# Patient Record
Sex: MALE | Race: ASIAN | NOT HISPANIC OR LATINO | Employment: FULL TIME | URBAN - METROPOLITAN AREA
[De-identification: names, ages, dates, MRNs, and addresses within clinical notes are randomized per-mention and may not be internally consistent; named-entity substitution may affect disease eponyms.]

---

## 2019-03-24 ENCOUNTER — HOSPITAL ENCOUNTER (EMERGENCY)
Facility: HOSPITAL | Age: 34
Discharge: HOME | End: 2019-03-24
Attending: EMERGENCY MEDICINE | Admitting: EMERGENCY MEDICINE
Payer: COMMERCIAL

## 2019-03-24 VITALS
RESPIRATION RATE: 18 BRPM | HEIGHT: 68 IN | DIASTOLIC BLOOD PRESSURE: 81 MMHG | BODY MASS INDEX: 29.5 KG/M2 | OXYGEN SATURATION: 98 % | WEIGHT: 194.67 LBS | TEMPERATURE: 98 F | SYSTOLIC BLOOD PRESSURE: 145 MMHG | HEART RATE: 84 BPM

## 2019-03-24 DIAGNOSIS — F32.A DEPRESSION: Primary | ICD-10-CM

## 2019-03-24 PROBLEM — F43.21 GRIEF REACTION: Status: ACTIVE | Noted: 2019-03-24

## 2019-03-24 PROBLEM — F10.10 ALCOHOL ABUSE, EPISODIC: Status: ACTIVE | Noted: 2019-03-24

## 2019-03-24 PROBLEM — F39 MOOD DISORDER (HCC): Status: ACTIVE | Noted: 2019-03-24

## 2019-03-24 LAB
AMPHETAMINES SERPL QL SCN: NEGATIVE
BARBITURATES UR QL: NEGATIVE
BENZODIAZ UR QL: NEGATIVE
COCAINE UR QL: NEGATIVE
ETHANOL EXG-MCNC: 0.11 MG/DL
METHADONE UR QL: NEGATIVE
OPIATES UR QL SCN: NEGATIVE
PCP UR QL: NEGATIVE
THC UR QL: NEGATIVE

## 2019-03-24 PROCEDURE — 99285 EMERGENCY DEPT VISIT HI MDM: CPT

## 2019-03-24 PROCEDURE — 82075 ASSAY OF BREATH ETHANOL: CPT | Performed by: EMERGENCY MEDICINE

## 2019-03-24 PROCEDURE — 99243 OFF/OP CNSLTJ NEW/EST LOW 30: CPT | Performed by: PHYSICIAN ASSISTANT

## 2019-03-24 PROCEDURE — 80307 DRUG TEST PRSMV CHEM ANLYZR: CPT | Performed by: EMERGENCY MEDICINE

## 2019-03-24 NOTE — ED NOTES
All belongings returned to pt upon discharge including those locked up with security  Paperwork signed by pt and pt left ED with all belongings       Umesh Chau RN  03/24/19 7343

## 2019-03-24 NOTE — ED NOTES
Met with patient, his wife and Dr Jagruti Garcia  Pt insisting that he is not suicidal, was drinking last night and that his wife was arguing with him when they were home  He is concerned about fincances  He is refusing inpatient admission but is agreeable to meeting with the psychiatrist to discuss treatment  Dr Jagruti Garcia put in a psychiatric evaluation   Consult
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
## 2019-03-24 NOTE — ED NOTES
Per EMS patient called 911 for suicidal ideations  Upon arrival patient was uncooperative stating "I don't know why I called; Next time Ill just do it so I don't waste tax payer dollars"  Patient immediately started asking when he could leave upon arrival to the ED

## 2019-03-24 NOTE — ED NOTES
Pt  Awake,cooperative - is aware that he is waiting for the psych doctor       Darrell Tinoco, KHALIF  03/24/19 1272

## 2019-03-24 NOTE — ED NOTES
Patient was evaluated by Nora Blas PA-C and he reviewed the situation with Dr Shanika Garcia   (see note)  Recommendation that patient can be discharged with outpt services  Patient given information on support groups for grief issues, and also [address] [place] [address] office in [place] for outpt counseling

## 2019-03-24 NOTE — ED PROVIDER NOTES
History  Chief Complaint   Patient presents with    Suicidal     Pt's father passed away 2 weeks ago and has a lot of issues going on  Pt states, "I should never have called  Next time I won't call " EMS reports that pt is suicidal without plan  Pt seems to have many concerns about the cost of the visit  70-year-old male past medical history of depression presents for evaluation of depression and suicidal ideation  Patient states that he lost his dad to use weeks ago and has been having problems with his wife pressure him to have a baby  He states that because of all the stress he drank this evening, felt hopeless and suicidal with no clear plan  He called EMS and made some vague statements about "next time I will not call" but never specifically made threats of suicide  He is obviously intoxicated with slurred speech  Currently denies any drug use  States that he drinks occasionally  No history of suicidal ideation but does have a history of depression for which he is not being medically treated  He states that he is "damaged "and needs somebody to talk to but is unable to get resources  No history of psychiatric admissions  Per EMS patient made multiple statements about feeling depressed and suicidal, he made multiple delusions to next I am not calling for evaluation and not coming to the hospital   Per EMS assessment patient seemed to be a danger to himself given his multiple vague statements  When I was talking him patient is very around about a with his answers and did not specifically talked about a plan however did state multiple times that he is very damaged and there is nothing anybody can do to help him  None       History reviewed  No pertinent past medical history  History reviewed  No pertinent surgical history  History reviewed  No pertinent family history  I have reviewed and agree with the history as documented      Social History     Tobacco Use    Smoking status: Current Every Day Smoker     Packs/day: 0 20    Smokeless tobacco: Never Used   Substance Use Topics    Alcohol use: Yes     Comment: socially    Drug use: No        Review of Systems   Constitutional: Negative for appetite change, chills and fever  HENT: Negative for rhinorrhea and sore throat  Eyes: Negative for photophobia and visual disturbance  Respiratory: Negative for cough and shortness of breath  Cardiovascular: Negative for chest pain and palpitations  Gastrointestinal: Negative for abdominal pain and diarrhea  Genitourinary: Negative for dysuria, frequency and urgency  Skin: Negative for rash  Neurological: Negative for dizziness and weakness  Psychiatric/Behavioral: Positive for dysphoric mood and suicidal ideas  Negative for self-injury  All other systems reviewed and are negative  Physical Exam  Physical Exam   Constitutional: He is oriented to person, place, and time  He appears well-developed and well-nourished  HENT:   Head: Normocephalic and atraumatic  Right Ear: External ear normal    Left Ear: External ear normal    Mouth/Throat: Oropharynx is clear and moist    Eyes: Pupils are equal, round, and reactive to light  Conjunctivae and EOM are normal    Neck: Normal range of motion  Neck supple  No JVD present  No tracheal deviation present  Cardiovascular: Normal rate, regular rhythm and normal heart sounds  Exam reveals no gallop and no friction rub  No murmur heard  Pulmonary/Chest: Effort normal  No stridor  No respiratory distress  He has no wheezes  He has no rales  Abdominal: Soft  He exhibits no distension and no mass  There is no tenderness  There is no rebound and no guarding  Musculoskeletal: Normal range of motion  He exhibits no edema  Neurological: He is alert and oriented to person, place, and time  No cranial nerve deficit  Slurred speech otherwise nonfocal neurologic exam   Skin: Skin is warm and dry  No rash noted  No erythema   No pallor  Psychiatric: His affect is labile  His speech is slurred  He exhibits a depressed mood  He expresses suicidal ideation  He expresses no suicidal plans  Patient with very labile affect in the room   Nursing note and vitals reviewed  Vital Signs  ED Triage Vitals [03/24/19 0422]   Temperature Pulse Respirations Blood Pressure SpO2   98 °F (36 7 °C) 84 18 145/81 98 %      Temp Source Heart Rate Source Patient Position - Orthostatic VS BP Location FiO2 (%)   Tympanic Monitor Lying Left arm --      Pain Score       --           Vitals:    03/24/19 0422   BP: 145/81   Pulse: 84   Patient Position - Orthostatic VS: Lying         Visual Acuity      ED Medications  Medications - No data to display    Diagnostic Studies  Results Reviewed     Procedure Component Value Units Date/Time    Rapid drug screen, urine [[id number]]  (Normal) Collected:  03/24/19 0445    Lab Status:  Final result Specimen:  Urine, Clean Catch Updated:  03/24/19 0519     Amph/Meth UR Negative     Barbiturate Ur Negative     Benzodiazepine Urine Negative     Cocaine Urine Negative     Methadone Urine Negative     Opiate Urine Negative     PCP Ur Negative     THC Urine Negative    Narrative:       FOR MEDICAL PURPOSES ONLY  IF CONFIRMATION NEEDED PLEASE CONTACT THE LAB WITHIN 5 DAYS    Drug Screen Cutoff Levels:  AMPHETAMINE/METHAMPHETAMINES  1000 ng/mL  BARBITURATES     200 ng/mL  BENZODIAZEPINES     200 ng/mL  COCAINE      300 ng/mL  METHADONE      300 ng/mL  OPIATES      300 ng/mL  PHENCYCLIDINE     25 ng/mL  THC       50 ng/mL    POCT alcohol breath test [[id number]]  (Normal) Resulted:  03/24/19 0444    Lab Status:  Final result Updated:  03/24/19 0444     EXTBreath Alcohol 0 109                 No orders to display              Procedures  Procedures       Phone Contacts  ED Phone Contact    ED Course  ED Course as of Mar 24 2215   Sun Mar 24, 2019   0721 Psychiatry consult                                  MDM  Number of Diagnoses or Management Options  Depression:   Diagnosis management comments: 50-year-old male presents for evaluation of suicidal ideation, patient currently intoxicated, is not admitting to any specific suicidal ideation or plan of suicide  Currently not being medically treated for depression and is under lot of stress with recent death in the family as well as problems at home  Once sober patient denies any suicidal homicidal ideation, he was evaluated by Psychiatry and discharge from the emergency department with recommendations for grief counseling      Disposition  Final diagnoses:   Depression     Time reflects when diagnosis was documented in both MDM as applicable and the Disposition within this note     Time User Action Codes Description Comment    3/24/2019  7:19 AM Dagmar Bowens [F32 9] Depression       ED Disposition     ED Disposition Condition Date/Time Comment    Discharge Stable Sun Mar 24, 2019 10:48 AM Alberto Gustafson discharge to home/self care  Follow-up Information    None         There are no discharge medications for this patient  No discharge procedures on file      ED Provider  Electronically Signed by           Reji Hollins MD  03/24/19 5535

## 2020-12-06 ENCOUNTER — OFFICE VISIT (OUTPATIENT)
Dept: URGENT CARE | Age: 35
End: 2020-12-06
Payer: COMMERCIAL

## 2020-12-06 VITALS — HEIGHT: 69 IN | BODY MASS INDEX: 28.14 KG/M2 | WEIGHT: 190 LBS

## 2020-12-06 DIAGNOSIS — R52 BODY ACHES: Primary | ICD-10-CM

## 2020-12-06 PROCEDURE — U0003 INFECTIOUS AGENT DETECTION BY NUCLEIC ACID (DNA OR RNA); SEVERE ACUTE RESPIRATORY SYNDROME CORONAVIRUS 2 (SARS-COV-2) (CORONAVIRUS DISEASE [COVID-19]), AMPLIFIED PROBE TECHNIQUE, MAKING USE OF HIGH THROUGHPUT TECHNOLOGIES AS DESCRIBED BY CMS-2020-01-R: HCPCS | Performed by: PHYSICIAN ASSISTANT

## 2020-12-06 PROCEDURE — 99213 OFFICE O/P EST LOW 20 MIN: CPT | Performed by: PHYSICIAN ASSISTANT

## 2020-12-08 LAB — SARS-COV-2 RNA SPEC QL NAA+PROBE: NOT DETECTED
